# Patient Record
Sex: FEMALE | Race: WHITE | NOT HISPANIC OR LATINO | Employment: FULL TIME | URBAN - METROPOLITAN AREA
[De-identification: names, ages, dates, MRNs, and addresses within clinical notes are randomized per-mention and may not be internally consistent; named-entity substitution may affect disease eponyms.]

---

## 2018-01-20 ENCOUNTER — OFFICE VISIT (OUTPATIENT)
Dept: URGENT CARE | Facility: CLINIC | Age: 33
End: 2018-01-20
Payer: COMMERCIAL

## 2018-01-20 PROCEDURE — G0463 HOSPITAL OUTPT CLINIC VISIT: HCPCS

## 2018-01-20 PROCEDURE — 99203 OFFICE O/P NEW LOW 30 MIN: CPT

## 2018-01-23 NOTE — PROGRESS NOTES
Assessment   1  Acute sinusitis (461 9) (J01 90)    Plan   Acute upper respiratory infection    · Amoxicillin 875 MG Oral Tablet; TAKE 1 TABLET EVERY 12 HOURS DAILY    Discussion/Summary   Discussion Summary:    Take abx as prescribed nasal saline rinses meds as needed for sxs control up with family dr if sxs worsen or persist     Medication Side Effects Reviewed: Possible side effects of new medications were reviewed with the patient/guardian today  Understands and agrees with treatment plan: The treatment plan was reviewed with the patient/guardian  The patient/guardian understands and agrees with the treatment plan    Counseling Documentation With Imm: The patient, patient's family was counseled regarding instructions for management,-- patient and family education,-- importance of compliance with treatment  Chief Complaint   1  Sore Throat  Chief Complaint Free Text Note Form: Sore throat and congestion started 10 days ago  History of Present Illness   HPI: 31yo F p/w sore throat and nasal congestion x 10 days  Pt denies n/v/d, sob/wheezing, fever/chills  Hospital Based Practices Required Assessment:      Pain Assessment      the patient states they do not have pain  Abuse And Domestic Violence Screen       Yes, the patient is safe at home  -- The patient states no one is hurting them  Depression And Suicide Screen  No, the patient has not had thoughts of hurting themself  No, the patient has not felt depressed in the past 7 days  Review of Systems   Focused-Female:      Constitutional: No fever, no chills, feels well, no tiredness, no recent weight gain or loss  ENT: sore throat, but-- no earache,-- no nosebleeds,-- no hearing loss,-- no nasal discharge-- and-- no hoarseness  Cardiovascular: no complaints of slow or fast heart rate, no chest pain, no palpitations, no leg claudication or lower extremity edema        Respiratory: no complaints of shortness of breath, no wheezing, no dyspnea on exertion, no orthopnea or PND  Breasts: no complaints of breast pain, breast lump or nipple discharge  Gastrointestinal: no complaints of abdominal pain, no constipation, no nausea or diarrhea, no vomiting, no bloody stools  Genitourinary: no complaints of dysuria, no incontinence, no pelvic pain, no dysmenorrhea, no vaginal discharge or abnormal vaginal bleeding  Musculoskeletal: no complaints of arthralgia, no myalgia, no joint swelling or stiffness, no limb pain or swelling  Integumentary: no complaints of skin rash or lesion, no itching or dry skin, no skin wounds  Neurological: no complaints of headache, no confusion, no numbness or tingling, no dizziness or fainting  ROS Reviewed:    ROS reviewed  Past Medical History   1  No pertinent past medical history  Active Problems And Past Medical History Reviewed: The active problems and past medical history were reviewed and updated today  Family History   Family History Reviewed: The family history was reviewed and updated today  Social History    · Never a smoker  Social History Reviewed: The social history was reviewed and is unchanged  Surgical History   Surgical History Reviewed: The surgical history was reviewed and updated today  Current Meds    1  No Reported Medications Recorded  Medication List Reviewed: The medication list was reviewed and updated today  Allergies   1  Lactose   2  Naprosyn TABS    Vitals   Signs   Recorded: [id number] 11:58AM   Temperature: 98 6 F  Heart Rate: 75  Respiration: 20  Systolic: 387  Diastolic: 69  Height: 5 ft 4 in  Weight: 139 lb   BMI Calculated: 23 86  BSA Calculated: 1 68  O2 Saturation: 98    Physical Exam        Constitutional      General appearance: No acute distress, well appearing and well nourished         Ears, Nose, Mouth, and Throat      External inspection of ears and nose: Normal        Otoscopic examination: Tympanic membranes translucent with normal light reflex  Canals patent without erythema  Nasal mucosa, septum, and turbinates: Abnormal   normal nasal septum,-- no intranasal masses or polyps-- and-- normal nasal turbinates  There was a purulent discharge from both nares  The bilateral nasal mucosa was edematous  Oropharynx: Normal with no erythema, edema, exudate or lesions  -- purulent post nasal drip  Pulmonary      Respiratory effort: No increased work of breathing or signs of respiratory distress  Auscultation of lungs: Clear to auscultation  no rales or crackles were heard bilaterally  no rhonchi  no friction rub  no wheezing  no diminished breath sounds  Cardiovascular      Palpation of heart: Normal PMI, no thrills  Auscultation of heart: Normal rate and rhythm, normal S1 and S2, without murmurs  Abdomen      Abdomen: Non-tender, no masses  Lymphatic      Palpation of lymph nodes in neck: Abnormal   bilateral anterior cervical node enlargement, but-- no posterior cervical node enlargement  Skin      Skin and subcutaneous tissue: Normal without rashes or lesions         Psychiatric      Orientation to person, place, and time: Normal        Mood and affect: Normal        Signatures    Electronically signed by : STEPHENIE Walker; Jan 21 2018 10:31AM EST                       (Author)     Electronically signed by : NAYAN Woods ; Jan 22 2018  1:29PM EST                       (Co-author)

## 2018-04-09 LAB
CLINICAL INFO: NORMAL
CYTO CVX: NORMAL
CYTOLOGY CMNT CVX/VAG CYTO-IMP: NORMAL
DATE PREVIOUS BX: NORMAL
LMP START DATE: NORMAL
SL AMB PREV. PAP:: NORMAL
SPECIMEN SOURCE CVX/VAG CYTO: NORMAL

## 2020-01-13 ENCOUNTER — TRANSCRIBE ORDERS (OUTPATIENT)
Dept: ADMINISTRATIVE | Facility: HOSPITAL | Age: 35
End: 2020-01-13

## 2020-01-13 DIAGNOSIS — R14.0 GASTRIC TYMPANY: Primary | ICD-10-CM

## 2020-01-22 ENCOUNTER — HOSPITAL ENCOUNTER (OUTPATIENT)
Dept: ULTRASOUND IMAGING | Facility: HOSPITAL | Age: 35
Discharge: HOME | End: 2020-01-22
Payer: COMMERCIAL

## 2020-01-22 DIAGNOSIS — R14.0 GASTRIC TYMPANY: ICD-10-CM

## 2020-01-22 PROCEDURE — 76700 US EXAM ABDOM COMPLETE: CPT

## 2022-05-03 DIAGNOSIS — K45.8 OTHER SPECIFIED ABDOMINAL HERNIA WITHOUT OBSTRUCTION OR GANGRENE: ICD-10-CM

## 2022-05-11 ENCOUNTER — HOSPITAL ENCOUNTER (OUTPATIENT)
Dept: CT IMAGING | Facility: HOSPITAL | Age: 37
Discharge: HOME | End: 2022-05-11
Payer: COMMERCIAL

## 2022-05-11 DIAGNOSIS — K45.8 OTHER SPECIFIED ABDOMINAL HERNIA WITHOUT OBSTRUCTION OR GANGRENE: ICD-10-CM

## 2022-05-11 PROCEDURE — 74176 CT ABD & PELVIS W/O CONTRAST: CPT

## 2023-06-20 ENCOUNTER — HOSPITAL ENCOUNTER (OUTPATIENT)
Dept: ULTRASOUND IMAGING | Facility: HOSPITAL | Age: 38
Discharge: HOME | End: 2023-06-20
Payer: COMMERCIAL

## 2023-06-20 DIAGNOSIS — R53.83 OTHER FATIGUE: ICD-10-CM

## 2023-06-20 DIAGNOSIS — M62.838 OTHER MUSCLE SPASM: ICD-10-CM

## 2023-06-20 DIAGNOSIS — R53.81 OTHER MALAISE: ICD-10-CM

## 2023-06-20 DIAGNOSIS — Q89.9 CONGENITAL MALFORMATION, UNSPECIFIED: ICD-10-CM

## 2023-06-20 PROCEDURE — 76705 ECHO EXAM OF ABDOMEN: CPT

## 2024-02-07 ENCOUNTER — OFFICE VISIT (OUTPATIENT)
Dept: PODIATRY | Age: 39
End: 2024-02-07
Payer: COMMERCIAL

## 2024-02-07 ENCOUNTER — HOSPITAL ENCOUNTER (OUTPATIENT)
Dept: RADIOLOGY | Facility: CLINIC | Age: 39
Discharge: HOME | End: 2024-02-07
Payer: COMMERCIAL

## 2024-02-07 VITALS
SYSTOLIC BLOOD PRESSURE: 122 MMHG | BODY MASS INDEX: 25.95 KG/M2 | DIASTOLIC BLOOD PRESSURE: 70 MMHG | TEMPERATURE: 98 F | HEART RATE: 87 BPM | OXYGEN SATURATION: 99 % | WEIGHT: 152 LBS | HEIGHT: 64 IN

## 2024-02-07 DIAGNOSIS — M79.2 NEURITIS: ICD-10-CM

## 2024-02-07 DIAGNOSIS — M79.671 RIGHT FOOT PAIN: Primary | ICD-10-CM

## 2024-02-07 DIAGNOSIS — S90.31XS CONTUSION OF RIGHT FOOT, SEQUELA: ICD-10-CM

## 2024-02-07 DIAGNOSIS — M20.5X1 HALLUX LIMITUS OF RIGHT FOOT: ICD-10-CM

## 2024-02-07 DIAGNOSIS — M79.671 RIGHT FOOT PAIN: ICD-10-CM

## 2024-02-07 PROCEDURE — 99204 OFFICE O/P NEW MOD 45 MIN: CPT | Performed by: STUDENT IN AN ORGANIZED HEALTH CARE EDUCATION/TRAINING PROGRAM

## 2024-02-07 PROCEDURE — 73630 X-RAY EXAM OF FOOT: CPT

## 2024-02-07 RX ORDER — LANOLIN ALCOHOL/MO/W.PET/CERES
1 CREAM (GRAM) TOPICAL 3 TIMES DAILY
COMMUNITY

## 2024-02-07 RX ORDER — GABAPENTIN 100 MG/1
100 CAPSULE ORAL
Qty: 30 CAPSULE | Refills: 0 | Status: SHIPPED | OUTPATIENT
Start: 2024-02-07

## 2024-02-07 RX ORDER — DIAZEPAM 2 MG/1
TABLET ORAL
COMMUNITY
Start: 2023-11-30

## 2024-02-07 RX ORDER — LEVONORGESTREL AND ETHINYL ESTRADIOL 0.1-0.02MG
KIT ORAL
COMMUNITY

## 2024-02-07 RX ORDER — MAGNESIUM GLUCONATE 30 MG(550)
30 TABLET ORAL 2 TIMES DAILY
COMMUNITY

## 2024-02-07 RX ORDER — CAYENNE 450 MG
CAPSULE ORAL
COMMUNITY

## 2024-02-07 NOTE — PROGRESS NOTES
Assessment/Plan:     Diagnoses and all orders for this visit:    Right foot pain  -     X-ray foot right 3+ views; Future  -     Ambulatory Referral to Podiatry  -     gabapentin (Neurontin) 100 mg capsule; Take 1 capsule (100 mg total) by mouth daily at bedtime  -     MRI foot/forefoot toes right wo contrast; Future    Neuritis  -     gabapentin (Neurontin) 100 mg capsule; Take 1 capsule (100 mg total) by mouth daily at bedtime  -     MRI foot/forefoot toes right wo contrast; Future    Contusion of right foot, sequela    Hallux limitus of right foot    Other orders  -     Probiotic Product (Acidophilus) CHEW; Chew  -     diazepam (VALIUM) 2 mg tablet; take 1 tablet by mouth every 8 hours if needed for anxiety or MUSCLE SPASMS  -     glucosamine-chondroitin 500-400 MG tablet; Take 1 tablet by mouth Three times a day  -     levonorgestrel-ethinyl estradiol (Lessina) 0.1-20 MG-MCG per tablet; Take by mouth (Patient not taking: Reported on 2/7/2024)  -     Magnesium Gluconate 550 MG TABS; Take 30 mg by mouth 2 (two) times a day          Imaging Reviewed at this visit (I personally reviewed/independently interpreted images and reports in PACS)  XR right foot WB 3v 2/7/24: No acute osseous abnormalities noted.  I do note elevation of 1st metatarsal with 1st MTPJ early arthritic changes     IMPRESSION:  Right foot pain (injury, contusion 2019). Differential diagnosis include nerve injury/neuritis, bone contusion, soft tissue contusion  Right hallux limitus/early arthritis      PLAN:  I reviewed clinical exam and radiographic imaging (XR) with patient in detail today. I have discussed with the patient the pathophysiology of this diagnosis and reviewed how the examination correlates with this diagnosis.  I explained potential dorsal right foot pain differentials as above. MRI ordered to further assess given 5yr h/o pain.   Gabapentin 100 mg HS ordered for patient; counseled regarding sedative effects of taking this  "medication.  Counseled not to take medication while driving or operating heavy machinery/using stairs.   Contrast bath and nerve desensitization protocols given. Pain creams recommended.   I recommend stiff bottomed sneakers/shoes (ex Asics, Vionic, New balance, Cox, etc) for daily use and Oofos, Hoka (recovery slides) for in home use. Avoid tight pressure to top of foot  I advised pt to obtain OTC Dananberg arch supports to address 1st MTPJ and limit jamming/future arthritis  F/u in 4-5 months (I recommended 1 month however w/ work, she cannot come back until summer). I will call if Mri abn    Subjective:      Patient ID: Mikala Wynn is a 38 y.o. female.    Mikala presents to clinic today concerning right foot pain. Foot pain has been present for 5 years after she dropped a heavy object on the top of her foot. There was swelling after the incident. Notes some pain has improved however there is still a sharp, shooting, tingling, burning pain which has not resolved. This worsens with pressure. PCP ordered XR and the report noted great toe arthritis which she would also like to talk about. Minimal pain to that site however.         The following portions of the patient's history were reviewed and updated as appropriate: allergies, current medications, past family history, past medical history, past social history, past surgical history, and problem list.    Review of Systems   Constitutional:  Negative for activity change, chills and fever.   HENT: Negative.     Respiratory:  Negative for cough, chest tightness and shortness of breath.    Cardiovascular:  Negative for chest pain and leg swelling.   Endocrine: Negative.    Genitourinary: Negative.    Neurological: Negative.  Negative for numbness.   Psychiatric/Behavioral: Negative.  Negative for agitation and behavioral problems.          Objective:      /70   Pulse 87   Temp 98 °F (36.7 °C) (Temporal)   Ht 5' 4\" (1.626 m)   Wt 68.9 kg (152 lb)   " SpO2 99%   BMI 26.09 kg/m²          Physical Exam  Constitutional:       Appearance: Normal appearance. She is not ill-appearing.   Cardiovascular:      Pulses: Normal pulses.      Comments: Bilateral DP & PT pulses 2/4. CRT WNL. Pedal hair present. Feet warm and well perfused.   Pulmonary:      Effort: Pulmonary effort is normal. No respiratory distress.   Musculoskeletal:         General: Tenderness (Right dorsal foot near extensor tendons over 1/3 TMTJ region distally. There is pain on palpation and with tinel/tapping. pain radiates.) and deformity (early right hallux limitus, structural) present. Normal range of motion.      Comments: Bilateral ankle, STJ, TNJ, TMTJ ROM WNL and without pain. LE muscle strength WNL.    No right foot pain with resisted PF/DF toes, ankle. No pain ROM TMTJs right.    Skin:     General: Skin is warm.      Capillary Refill: Capillary refill takes less than 2 seconds.      Findings: No erythema or lesion.   Neurological:      General: No focal deficit present.      Mental Status: She is alert and oriented to person, place, and time.      Sensory: No sensory deficit.      Comments: Gross sensation intact. + tingling/burning top of right foot.    Psychiatric:         Mood and Affect: Mood normal.

## 2024-02-07 NOTE — PATIENT INSTRUCTIONS
Foot Pain Home Therapy    Wear supportive shoes at all times. Avoid flip-flops, flat sandals, barefoot walking (never walk barefoot, even at home). Generally avoid shoes that are too flexible and bend in the arch. Your shoes should only slightly bend in the toe area, not the middle. Running sneakers are often the best choice.  Supportive sneaker brands: Cox, On Cloud, Hoka, Altra, New Balance, Asics, Mizuno  [employer] [place] [employer] (discount if mention Dr referred)  Fleet Feet [place]  [facility]   [place] [employer] [place]  Supportive daily shoe brands: Vionic, Orthofeet, Martine, Dansko, Chacos, Martin, Teva, Birkenstock  Supportive home shoes: DELMIofFaheem mayberry (recovery slides)  Purchase over the counter topical pain creams such as Voltarin gel, biofreeze, or CBD cream - will need to apply 2-3 times per day for benefit. + deep tissue massage.   Look in to over the counter shoe inserts/arch supports such as Dananberg arch supports. These are all available on Amazon.com as well as their individual website's. Remove sticky tabs.   Amazon.com: Vasyli+Dananberg 1st Ray Orthotic, Medium, 1st Ray Function, Medium Density, Full-Length Insole, Heat Molding Optional, Best All Around Orthotic, Functional Biomechanical Control for Pain Relief, Black Yellow (54056) : Health & Household        Contrast Bath Protocol    Purpose: this will help decrease swelling and edema to the injured body part (including one that has just had surgery). It will also help decrease hypersensitivities &/or nerve inflammation of the lower extremity which can occur after trauma or surgery.    Obtain one bucket of ice water and one bucket of exactly 100-degree bath water.    Place foot in ice water for one minute.  Place foot in 100-degree bath water for 3 minutes.  Perform this for a total of 5 times in the ice water and 4 times in the warm water (start and end in ice water).      HomeDesensitizationProgram.pdf ([facility], [place])

## 2024-07-03 ENCOUNTER — HOSPITAL ENCOUNTER (OUTPATIENT)
Dept: MRI IMAGING | Facility: HOSPITAL | Age: 39
Discharge: HOME | End: 2024-07-03
Attending: STUDENT IN AN ORGANIZED HEALTH CARE EDUCATION/TRAINING PROGRAM
Payer: COMMERCIAL

## 2024-07-03 DIAGNOSIS — M79.2 NEURITIS: ICD-10-CM

## 2024-07-03 DIAGNOSIS — M79.671 RIGHT FOOT PAIN: ICD-10-CM

## 2024-07-03 PROCEDURE — 73718 MRI LOWER EXTREMITY W/O DYE: CPT

## 2024-07-03 PROCEDURE — G1004 CDSM NDSC: HCPCS

## 2024-07-10 ENCOUNTER — OFFICE VISIT (OUTPATIENT)
Dept: PODIATRY | Age: 39
End: 2024-07-10
Payer: COMMERCIAL

## 2024-07-10 VITALS
HEART RATE: 90 BPM | WEIGHT: 151.2 LBS | RESPIRATION RATE: 20 BRPM | BODY MASS INDEX: 25.81 KG/M2 | DIASTOLIC BLOOD PRESSURE: 64 MMHG | HEIGHT: 64 IN | SYSTOLIC BLOOD PRESSURE: 100 MMHG | TEMPERATURE: 97.4 F | OXYGEN SATURATION: 99 %

## 2024-07-10 DIAGNOSIS — M79.671 RIGHT FOOT PAIN: Primary | ICD-10-CM

## 2024-07-10 DIAGNOSIS — M79.2 NEURITIS: ICD-10-CM

## 2024-07-10 DIAGNOSIS — M20.5X1 HALLUX LIMITUS OF RIGHT FOOT: ICD-10-CM

## 2024-07-10 DIAGNOSIS — S90.31XS CONTUSION OF RIGHT FOOT, SEQUELA: ICD-10-CM

## 2024-07-10 PROCEDURE — 99213 OFFICE O/P EST LOW 20 MIN: CPT | Performed by: STUDENT IN AN ORGANIZED HEALTH CARE EDUCATION/TRAINING PROGRAM

## 2024-07-10 RX ORDER — GABAPENTIN 100 MG/1
100 CAPSULE ORAL
Qty: 30 CAPSULE | Refills: 0 | Status: SHIPPED | OUTPATIENT
Start: 2024-07-10

## 2024-07-10 NOTE — PATIENT INSTRUCTIONS
Foot Pain Home Therapy    Wear supportive shoes at all times. Avoid flip-flops, flat sandals, barefoot walking (never walk barefoot, even at home). Generally avoid shoes that are too flexible and bend in the arch. Your shoes should only slightly bend in the toe area, not the middle. Running sneakers are often the best choice.  Supportive sneaker brands: Cox, On Cloud, Hoka, Altra, New Balance, Asics, Mizuno  [employer] [employer] (discount if mention Dr referred)  Fleet Feet [place]  [facility]   [place] [place]  Supportive daily shoe brands: Vionic, Orthofeet, Martine, Dansko, Chacos, Martin, Teva, Birkenstock  Supportive home shoes: Faheem Jo (recovery slides)  Purchase over the counter topical pain creams such as Voltarin gel, biofreeze, or CBD cream - will need to apply 2-3 times per day for benefit. + deep tissue massage.   Look in to over the counter shoe inserts/arch supports such as vasyli-Dananberg arch supports (remove tabs under 1st toes). These are all available on Amazon.com as well as their individual website's.   [place]: Vasyli+Dananberg 1st Ray Orthotic, Medium, 1st Ray Function, Medium Density, Full-Length Insole, Heat Molding Optional, Best All Around Orthotic, Functional Biomechanical Control for Pain Relief, Black Yellow (48497) : Health & Household

## 2024-07-10 NOTE — PROGRESS NOTES
Assessment/Plan:     Diagnoses and all orders for this visit:    Right foot pain  -     gabapentin (Neurontin) 100 mg capsule; Take 1 capsule (100 mg total) by mouth daily at bedtime  -     Ambulatory referral to Physical Therapy; Future    Neuritis  -     gabapentin (Neurontin) 100 mg capsule; Take 1 capsule (100 mg total) by mouth daily at bedtime  -     Ambulatory referral to Physical Therapy; Future    Contusion of right foot, sequela    Hallux limitus of right foot            Imaging Reviewed at this visit (I personally reviewed/independently interpreted images and reports in PACS)  MRI right foot 7/3/24: normal MRI    Prior Imaging  XR right foot WB 3v 2/7/24: No acute osseous abnormalities noted.  I do note elevation of 1st metatarsal with 1st MTPJ early arthritic changes     IMPRESSION:  Right foot pain (injury, contusion 2019). Differential diagnosis include nerve injury/neuritis, DPN impingement, bone contusion, soft tissue contusion  Right hallux limitus/early arthritis      PLAN:  MRI reviewed as above WNL. I again discussed her symptoms as likely nerve related after injury. Patient has not really attempted conservative care. PT rx given for deep tissue massage. C/w offloading and avoid pressure as this is when symptoms present. I discussed possible nerve release prn.   I again ordered Gabapentin 100 mg HS ordered for patient as she did not fill this rx previously; counseled regarding sedative effects of taking this medication.  Counseled not to take medication while driving or operating heavy machinery/using stairs.   I again advised stiff bottomed sneakers/shoes (ex Asics, Vionic, New balance, Cox, etc) for daily use and Faheem Jo (recovery slides) for in home use. Avoid tight pressure to top of foot  I again advised pt to obtain OTC Dananberg arch supports to address 1st MTPJ and limit jamming/future arthritis. Consider injection prn.   F/u PRN    Subjective:      Patient ID: Mikala Kingsley is a  "39 y.o. female.    Mikala presents to clinic today concerning f/u right foot pain. Had MRI. Did not take the gabapentin, did not get rx filled. Was worried about being tired. Did not get new shoes or arch supports. Notes only pain to top of foot with pressure. Notes soreness to great toe/arthritis at this point.     \"Foot pain has been present for 5 years after she dropped a heavy object on the top of her foot. There was swelling after the incident. Notes some pain has improved however there is still a sharp, shooting, tingling, burning pain which has not resolved. This worsens with pressure. PCP ordered XR and the report noted great toe arthritis which she would also like to talk about. Minimal pain to that site however.\"         The following portions of the patient's history were reviewed and updated as appropriate: allergies, current medications, past family history, past medical history, past social history, past surgical history, and problem list.    Review of Systems   Constitutional:  Negative for activity change, chills and fever.   HENT: Negative.     Respiratory:  Negative for cough, chest tightness and shortness of breath.    Cardiovascular:  Negative for chest pain and leg swelling.   Endocrine: Negative.    Genitourinary: Negative.    Neurological: Negative.  Negative for numbness.   Psychiatric/Behavioral: Negative.  Negative for agitation and behavioral problems.          Objective:      /64 (BP Location: Left arm, Patient Position: Sitting, Cuff Size: Standard)   Pulse 90   Temp (!) 97.4 °F (36.3 °C)   Resp 20   Ht 5' 4\" (1.626 m)   Wt 68.6 kg (151 lb 3.2 oz)   SpO2 99%   BMI 25.95 kg/m²          Physical Exam  Constitutional:       Appearance: Normal appearance. She is not ill-appearing.   Cardiovascular:      Pulses: Normal pulses.      Comments: Bilateral DP & PT pulses 2/4. CRT WNL. Pedal hair present. Feet warm and well perfused.   Pulmonary:      Effort: Pulmonary effort is " normal. No respiratory distress.   Musculoskeletal:         General: Tenderness (Right dorsal foot near extensor tendons over 1/3 TMTJ region distally. There is pain on palpation and with tinel/tapping. pain radiates.) and deformity (early right hallux limitus, structural) present. Normal range of motion.      Comments: Bilateral ankle, STJ, TNJ, TMTJ ROM WNL and without pain. LE muscle strength WNL.    No right foot pain with resisted PF/DF toes, ankle. No pain ROM TMTJs right.    Skin:     General: Skin is warm.      Capillary Refill: Capillary refill takes less than 2 seconds.      Findings: No erythema or lesion.   Neurological:      General: No focal deficit present.      Mental Status: She is alert and oriented to person, place, and time.      Sensory: No sensory deficit.      Comments: Gross sensation intact. + tingling/burning top of right foot at deep peroneal nerve.    Psychiatric:         Mood and Affect: Mood normal.